# Patient Record
Sex: MALE | Race: WHITE | NOT HISPANIC OR LATINO | URBAN - METROPOLITAN AREA
[De-identification: names, ages, dates, MRNs, and addresses within clinical notes are randomized per-mention and may not be internally consistent; named-entity substitution may affect disease eponyms.]

---

## 2017-07-26 ENCOUNTER — IMPORTED ENCOUNTER (OUTPATIENT)
Dept: URBAN - METROPOLITAN AREA CLINIC 50 | Facility: CLINIC | Age: 27
End: 2017-07-26

## 2018-10-12 ENCOUNTER — IMPORTED ENCOUNTER (OUTPATIENT)
Dept: URBAN - METROPOLITAN AREA CLINIC 50 | Facility: CLINIC | Age: 28
End: 2018-10-12

## 2020-01-27 ENCOUNTER — IMPORTED ENCOUNTER (OUTPATIENT)
Dept: URBAN - METROPOLITAN AREA CLINIC 50 | Facility: CLINIC | Age: 30
End: 2020-01-27

## 2020-01-27 ENCOUNTER — PREPPED CHART (OUTPATIENT)
Dept: URBAN - METROPOLITAN AREA CLINIC 50 | Facility: CLINIC | Age: 30
End: 2020-01-27

## 2020-01-27 NOTE — PATIENT DISCUSSION
Discussed ocular health, eyeglass prescription given. Patient instructed to call office immediately if sudden changes in vision occur. Emphasized importance of sunglasses and a healthy lifestyle.

## 2021-04-10 ASSESSMENT — VISUAL ACUITY
OD_CC: 20/20-
OU_CC: 20/20
OS_CC: 20/20
OU_CC: J1+

## 2021-04-18 ASSESSMENT — VISUAL ACUITY
OD_CC: J1+
OD_CC: J1
OS_CC: J1+
OS_CC: 20/20
OD_CC: 20/25+2
OD_CC: 20/20-
OS_CC: J1+
OS_CC: J1
OD_CC: 20/20-
OS_CC: 20/20
OD_CC: J1+

## 2021-04-18 ASSESSMENT — TONOMETRY
OD_IOP_MMHG: 15
OD_IOP_MMHG: 10
OS_IOP_MMHG: 12

## 2021-04-19 ENCOUNTER — ROUTINE EXAM (OUTPATIENT)
Dept: URBAN - METROPOLITAN AREA CLINIC 50 | Facility: CLINIC | Age: 31
End: 2021-04-19

## 2021-04-19 DIAGNOSIS — Z01.00: ICD-10-CM

## 2021-04-19 DIAGNOSIS — H52.13: ICD-10-CM

## 2021-04-19 PROCEDURE — 92015 DETERMINE REFRACTIVE STATE: CPT

## 2021-04-19 PROCEDURE — 92014 COMPRE OPH EXAM EST PT 1/>: CPT

## 2021-04-19 ASSESSMENT — TONOMETRY
OS_IOP_MMHG: 12
OD_IOP_MMHG: 13

## 2021-04-19 ASSESSMENT — VISUAL ACUITY
OS_CC: 20/20
OU_CC: J1+
OD_CC: 20/25

## 2022-06-08 ENCOUNTER — COMPREHENSIVE EXAM (OUTPATIENT)
Dept: URBAN - METROPOLITAN AREA CLINIC 53 | Facility: CLINIC | Age: 32
End: 2022-06-08

## 2022-06-08 DIAGNOSIS — Z01.01: ICD-10-CM

## 2022-06-08 DIAGNOSIS — H40.013: ICD-10-CM

## 2022-06-08 PROCEDURE — 92015 DETERMINE REFRACTIVE STATE: CPT

## 2022-06-08 PROCEDURE — 92014 COMPRE OPH EXAM EST PT 1/>: CPT

## 2022-06-08 ASSESSMENT — VISUAL ACUITY
OU_CC: J1+
OS_CC: 20/20
OD_CC: 20/25

## 2022-06-08 ASSESSMENT — TONOMETRY
OS_IOP_MMHG: 11
OD_IOP_MMHG: 14

## 2023-07-06 ENCOUNTER — COMPREHENSIVE EXAM (OUTPATIENT)
Dept: URBAN - METROPOLITAN AREA CLINIC 53 | Facility: CLINIC | Age: 33
End: 2023-07-06

## 2023-07-06 DIAGNOSIS — H16.223: ICD-10-CM

## 2023-07-06 DIAGNOSIS — Z01.01: ICD-10-CM

## 2023-07-06 DIAGNOSIS — H18.593: ICD-10-CM

## 2023-07-06 DIAGNOSIS — H40.013: ICD-10-CM

## 2023-07-06 DIAGNOSIS — H52.13: ICD-10-CM

## 2023-07-06 PROCEDURE — 92015 DETERMINE REFRACTIVE STATE: CPT

## 2023-07-06 PROCEDURE — 92014 COMPRE OPH EXAM EST PT 1/>: CPT

## 2023-07-06 ASSESSMENT — KERATOMETRY
OS_K1POWER_DIOPTERS: 38.00
OD_AXISANGLE2_DEGREES: 91
OD_K2POWER_DIOPTERS: 41.50
OS_AXISANGLE_DEGREES: 170
OD_K1POWER_DIOPTERS: 38.00
OS_AXISANGLE2_DEGREES: 80
OS_K2POWER_DIOPTERS: 40.25
OD_AXISANGLE_DEGREES: 1

## 2023-07-06 ASSESSMENT — VISUAL ACUITY
OD_CC: 20/30
OD_PH: 20/20
OS_CC: 20/20-1
OU_CC: J1+@16

## 2023-07-06 ASSESSMENT — TONOMETRY
OD_IOP_MMHG: 17
OS_IOP_MMHG: 15